# Patient Record
Sex: MALE | Race: ASIAN | NOT HISPANIC OR LATINO | URBAN - METROPOLITAN AREA
[De-identification: names, ages, dates, MRNs, and addresses within clinical notes are randomized per-mention and may not be internally consistent; named-entity substitution may affect disease eponyms.]

---

## 2017-05-16 ENCOUNTER — ALLSCRIPTS OFFICE VISIT (OUTPATIENT)
Dept: OTHER | Facility: OTHER | Age: 52
End: 2017-05-16

## 2017-05-16 DIAGNOSIS — E11.65 TYPE 2 DIABETES MELLITUS WITH HYPERGLYCEMIA (HCC): ICD-10-CM

## 2017-09-05 ENCOUNTER — ALLSCRIPTS OFFICE VISIT (OUTPATIENT)
Dept: OTHER | Facility: OTHER | Age: 52
End: 2017-09-05

## 2018-01-12 NOTE — PROGRESS NOTES
Assessment    1  Benign essential hypertension (401 1) (I10)   2  Diabetes mellitus type 2, uncontrolled (250 02) (E11 65)   3  Candidal intertrigo (112 3) (B37 2)   4  Deafness (389 9) (H91 90)    Plan  Candidal intertrigo    · Nystatin 860270 UNIT/GM External Cream   · Clotrimazole 1 % External Cream; APPLY SPARINGLY TO AFFECTED AREA(S) TWICE DAILY  Diabetes mellitus type 2, uncontrolled    · GlipiZIDE-MetFORMIN HCl - 5-500 MG Oral Tablet   · GlipiZIDE 5 MG Oral Tablet; TAKE 1 TABLET TWICE DAILY   · Januvia 100 MG Oral Tablet; TAKE 1 TABLET DAILY   · MetFORMIN HCl - 1000 MG Oral Tablet (Glucophage); TAKE 1 TABLET TWICE DAILY  Type 2 diabetes mellitus    · Januvia 100 MG Oral Tablet; TAKE 1 TABLET DAILY    Discussion/Summary  Discussion Summary:   Reviewed labs with patient  a1c 10 3  Taking glipizide metformin 5/500 twice daily (due to control and cost of med as patient has limited insurance coverage)  Stop this medication today  Start Januvia 100 mg once daily  Start metformin 1000 mg twice daily  Start glipizide 5 mg twice daily with meals  Sample of Januvia given today  Start clotrimazole topically to area twice daily  Use topical Hydrocortone 1% over the counter twice daily with clotrimazole for 5 days then discontinue  [facility] follow up in 4-6 weeks with BS log  Patient BP stable on ACE  Counseling Documentation With Imm: The patient was counseled regarding instructions for management, risk factor reductions, prognosis, patient and family education, impressions, risks and benefits of treatment options, importance of compliance with treatment  Medication SE Review and Pt Understands Tx: Possible side effects of new medications were reviewed with the patient/guardian today  The treatment plan was reviewed with the patient/guardian  The patient/guardian understands and agrees with the treatment plan      Chief Complaint  Chief Complaint Free Text Note Form: PT  presents for 1 month follow up for HTN, DM  Review Labs      History of Present Illness  HPI: 45year old male for follow up labs  Diabetes Type II (Follow-Up): The patient states he has been stable with his Type II Diabetes control since the last visit  Comorbid Illnesses: hypertension and hyperlipidemia  Symptoms: Associated symptoms include polyuria and polydipsia  Home monitoring: The patient checks his blood sugars regularly  Glycemic control has been good  Medications: the patient is adherent with his medication regimen  He denies medication side effects  Due For: a lipid panel  Hypertension (Follow-Up): The patient presents for follow-up of essential hypertension  The patient states he has been stable with his blood pressure control since the last visit  Symptoms: denies chest pain  Medications: the patient is adherent with his medication regimen  He denies medication side effects  Intertrigo (Brief): The patient is being seen for a routine clinic follow-up of intertrigo  Symptoms:  rash and localized rash  The patient is currently experiencing symptoms  Associated symptoms:  localized pruritus, but no skin soreness and no skin breakdown  Review of Systems  Complete-Male:   Constitutional: Encounter with signer, but no fever  Cardiovascular: no chest pain and no palpitations  Respiratory: no shortness of breath and no cough  Gastrointestinal: no nausea, no vomiting and no diarrhea  Integumentary: + rash in groin, purutis  Used topical crema whih he ran out of which improved  Active Problems    1  Benign essential hypertension (401 1) (I10)   2  Candidal intertrigo (112 3) (B37 2)   3  Deafness (389 9) (H91 90)   4  Diabetes mellitus type 2, uncontrolled (250 02) (E11 65)   5  Fatigue (780 79) (R53 83)   6  Headache (784 0) (R51)   7  Hyperglycemia (790 29) (R73 9)   8  Hypertriglyceridemia (272 1) (E78 1)   9  Pruritus (698 9) (L29 9)   10  Type 2 diabetes mellitus (250 00) (E11 9)    Past Medical History    1   History of Abscess of buttock (682 5) (L02 31)   2  History of Encounter for prostate cancer screening (V76 44) (Z12 5)   3  History of low back pain (V13 59) (Z87 39)   4  History of Influenza vaccine needed (V04 81) (Z23)  Active Problems And Past Medical History Reviewed: The active problems and past medical history were reviewed and updated today  Surgical History    1  Denied: History Of Prior Surgery  Surgical History Reviewed: The surgical history was reviewed and updated today  Family History    1  Family history of    2  Family history of malignant neoplasm (V16 9) (Z80 9)  Family History Reviewed: The family history was reviewed and updated today  Social History    · Never smoker   · No alcohol use   · No drug use  Social History Reviewed: The social history was reviewed and updated today  The social history was reviewed and is unchanged  Current Meds   1  Joe Contour Next Test In [employer]; TEST TWICE DAILY; Therapy: 71Ews3704 to (Evaluate:2016); Last Rx:13Jqg0045 Ordered   2  Joe Microlet Lancets Miscellaneous; TEST BLOOD SUGAR TWICE DAILY; Therapy: 37Lzc5013 to (Last Rx:16Ool0459) Ordered   3  Fenofibrate 54 MG Oral Tablet; TAKE 1 TABLET DAILY; Therapy: 22Ghn2886 to (Evaluate:2016); Last Rx:79Oyi7715 Ordered   4  GlipiZIDE-MetFORMIN HCl - 5-500 MG Oral Tablet; TAKE 1 TABLETS TWICE DAILY; Therapy: 93OND0856 to (Last Rx:77Ylb1346) Ordered   5  Lisinopril 10 MG Oral Tablet; TAKE 1 TABLET DAILY; Therapy: 21LDJ7860 to (Evaluate:2016); Last Rx:97Cbf0633 Ordered   6  Nystatin 888413 UNIT/GM External Cream; APPLY 2-3 TIMES DAILY TO AFFECTED AREA(S); Therapy: 87OVU1989 to (Last Rx:43Snk4038) Ordered   7  ZyrTEC Allergy 10 MG Oral Tablet; TAKE 1 TABLET DAILY; Therapy: 49Crt3438 to (Evaluate:2016); Last Rx:81Qic3592 Ordered  Medication List Reviewed: The medication list was reviewed and updated today  Allergies    1   No Known Drug Allergies    2  Seasonal    Vitals  Vital Signs [Data Includes: Current Encounter]    Recorded: 22QVW6252 11:14AM   Temperature 98 3 F, Tympanic   Heart Rate 80   Systolic 091, LUE, Sitting   Diastolic 88, LUE, Sitting   Height 5 ft 8 in   Weight 162 lb 4 oz   BMI Calculated 24 67   BSA Calculated 1 87   O2 Saturation 98     Physical Exam    Constitutional   General appearance: No acute distress, well appearing and well nourished  Alert, pleasant seated in NAD, signer and roomate present  Eyes   Conjunctiva and lids: No swelling, erythema, or discharge  Ears, Nose, Mouth, and Throat   Oropharynx: Normal with no erythema, edema, exudate or lesions  MMM  Pulmonary   Respiratory effort: No increased work of breathing or signs of respiratory distress  Auscultation of lungs: Clear to auscultation, equal breath sounds bilaterally, no wheezes, no rales, no rhonci  Cardiovascular   Auscultation of heart: Normal rate and rhythm, normal S1 and S2, without murmurs  RRR  Abdomen   Abdomen: Non-tender, no masses  soft, NT/ND  Lymphatic   Palpation of lymph nodes in neck: No lymphadenopathy  Musculoskeletal   Gait and station: Normal     Skin + puritic skin inner thighs bilaterally  No erythema  skin intact     Psychiatric   Mood and affect: Normal          Future Appointments    Date/Time Provider Specialty Site   02/26/2016 10:30 AM Ryan Salazar [facility] Internal Medicine [facility] PRIMARY CARE     Signatures   Electronically signed by : Rosemary Le [facility]; El 15 2016 12:24PM EST                       (Author)    Electronically signed by : Jigna العراقي MD; El 15 2016  5:28PM EST

## 2018-01-13 VITALS
HEART RATE: 71 BPM | SYSTOLIC BLOOD PRESSURE: 180 MMHG | TEMPERATURE: 97 F | OXYGEN SATURATION: 98 % | HEIGHT: 67 IN | DIASTOLIC BLOOD PRESSURE: 90 MMHG | WEIGHT: 158.6 LBS | BODY MASS INDEX: 24.89 KG/M2

## 2018-01-13 NOTE — PROGRESS NOTES
Assessment    1  Diabetes mellitus type 2, uncontrolled (250 02) (E11 65)   2  Right-sided low back pain with right-sided sciatica (724 3) (M54 41)    Plan  Candidal intertrigo    · Clotrimazole 1 % External Cream; APPLY SPARINGLY TO AFFECTED AREA(S) TWICE DAILY  Diabetes mellitus type 2, uncontrolled    · GlipiZIDE 5 MG Oral Tablet; TAKE 1 TABLET TWICE DAILY   · (1) HEMOGLOBIN A1C; Status:Active; Requested for:25Nig3896;   Right-sided low back pain with right-sided sciatica    · Meloxicam 15 MG Oral Tablet; Take 1 tablet daily   · *1 - [facility] Physical Therapy  Consult  Status: Hold For - Scheduling  Requested for:  99QFS2672  Care Summary provided  : Yes    Discussion/Summary  Discussion Summary: For back pain: advise physical therapy  Can use meloxicam as needed once per day  For Diabetes: Printed prescription for glipizide  It is part of [facility] prescription program, please try to fill there  Continue to check BG and keep a log to bring to your next appointment  Check hemoglobin A1c after 4/16/2016  Counseling Documentation With Imm: The patient was counseled regarding instructions for management, risk factor reductions, prognosis, patient and family education, impressions, risks and benefits of treatment options, importance of compliance with treatment  Medication SE Review and Pt Understands Tx: Possible side effects of new medications were reviewed with the patient/guardian today  The treatment plan was reviewed with the patient/guardian  The patient/guardian understands and agrees with the treatment plan      Chief Complaint  Chief Complaint Free Text Note Form: PT  presents for 4 week follow up for DM  Review BS log PT  states having B leg pain  Pain starting in Lower back  Started about a year ago, went away and came back  PT  works all day on feet  History of Present Illness  HPI: Pt is here for follow up for DM, poorly controlled  Recent A1C 10     Has not been taking Glipizide due to cost, reports it was $300  Has been making dietary changes  Complaints of back pain with radiation to right leg, has been a chronic problem, flares at times  Diabetes Type II (Follow-Up): The patient states he has been stable with his Type II Diabetes control since the last visit  Comorbid Illnesses: hypertension and hyperlipidemia  He has no significant interval events  Symptoms: The patient is currently asymptomatic  Associated symptoms include polyuria, but no polydipsia  Home monitoring: The patient checks his blood sugars regularly  Glycemic control has been poor  the patient reports no symptomatic hypoglycemic episodes  Medications: the patient is not adherent with his medication regimen  He denies medication side effects  Due For: a lipid panel  Back Pain (Brief): The patient is being seen for an initial evaluation of and existing diagnosis per patient back pain  This condition is not related to a specific injury  The patient has been previously evaluated by a primary care provider and by a specialty provider  (none) Symptoms:  back pain and back stiffness, but no decreased spine range of motion, no decreased flexion, no decreased extension, no decreased lateral bending, no decreased rotation, no lower extremity numbness, no lower extremity tingling and no lower extremity weakness  The pain is located in the right mid back  The pain radiates to right thigh and right calf  The patient describes the pain as sharp  The pain is intermittent  The patient describes symptoms as unchanged  Exacerbating factors:  standing and walking  Relieving factors:  did not try any treatment  Review of Systems  Complete-Male:   Constitutional: Encounter with signer, but no fever, not feeling poorly, no chills and not feeling tired  ENT: no complaints of earache, no hearing loss, no nosebleeds, no nasal discharge, no sore throat, no hoarseness     Cardiovascular: no chest pain, no palpitations and no extremity edema  Respiratory: no shortness of breath, no cough, no orthopnea, no shortness of breath during exertion and no PND  Gastrointestinal: no abdominal pain, no nausea, no vomiting and no diarrhea  Genitourinary: no dysuria  Musculoskeletal: arthralgias and myalgias  Neurological: no headache, no numbness, no dizziness and no fainting  Active Problems    1  Benign essential hypertension (401 1) (I10)   2  Candidal intertrigo (112 3) (B37 2)   3  Deafness (389 9) (H91 90)   4  Diabetes mellitus type 2, uncontrolled (250 02) (E11 65)   5  Fatigue (780 79) (R53 83)   6  Headache (784 0) (R51)   7  Hyperglycemia (790 29) (R73 9)   8  Hypertriglyceridemia (272 1) (E78 1)   9  Pruritus (698 9) (L29 9)   10  Type 2 diabetes mellitus (250 00) (E11 9)    Past Medical History    1  History of Abscess of buttock (682 5) (L02 31)   2  History of Encounter for prostate cancer screening (V76 44) (Z12 5)   3  History of low back pain (V13 59) (Z87 39)   4  History of Influenza vaccine needed (V04 81) (Z23)  Active Problems And Past Medical History Reviewed: The active problems and past medical history were reviewed and updated today  Surgical History    1  Denied: History Of Prior Surgery    Family History    1  Family history of    2  Family history of malignant neoplasm (V16 9) (Z80 9)    Social History    · Never smoker   · No alcohol use   · No drug use  Social History Reviewed: The social history was reviewed and updated today  The social history was reviewed and is unchanged  Current Meds   1  Joe Contour Next Test In [employer]; TEST TWICE DAILY; Therapy: 43Wrs4109 to (Evaluate:98Hxk5832); Last Rx:18Ddh5283 Ordered   2  Joe Microlet Lancets Miscellaneous; TEST BLOOD SUGAR TWICE DAILY; Therapy: 12Ohl2692 to (Last Rx:82Xom5820) Ordered   3  Clotrimazole 1 % External Cream; APPLY SPARINGLY TO AFFECTED AREA(S) TWICE DAILY;    Therapy: 95VCS2565 to (Evaluate:67Ntt3731); Last Rx:15Jan2016 Ordered   4  Fenofibrate 54 MG Oral Tablet; TAKE 1 TABLET DAILY; Therapy: 70Xje0855 to (Evaluate:08Jun2016); Last Rx:63Qyo2411 Ordered   5  GlipiZIDE 5 MG Oral Tablet; TAKE 1 TABLET TWICE DAILY; Therapy: 01NVI1075 to (Last Rx:15Jan2016) Ordered   6  Januvia 100 MG Oral Tablet; TAKE 1 TABLET DAILY; Therapy: 67XQW1429 to (Evaluate:12Oly1899); Last Rx:15Jan2016 Ordered   7  Januvia 100 MG Oral Tablet; TAKE 1 TABLET DAILY; Therapy: 74DTP6115 to (Evaluate:23Ihy1200); Last Rx:15Jan2016 Ordered   8  Lisinopril 10 MG Oral Tablet; TAKE 1 TABLET DAILY; Therapy: 26ACA5098 to (Evaluate:10Mar2016); Last Rx:91Qof6082 Ordered   9  MetFORMIN HCl - 1000 MG Oral Tablet; TAKE 1 TABLET TWICE DAILY; Therapy: 16RAL3322 to (Evaluate:83Lsh9736); Last Rx:15Jan2016 Ordered   10  ZyrTEC Allergy 10 MG Oral Tablet; TAKE 1 TABLET DAILY; Therapy: 71Cgg0971 to (Evaluate:06Whx2821); Last Rx:15Gpj2786 Ordered  Medication List Reviewed: The medication list was reviewed and updated today  Allergies    1  No Known Drug Allergies    2  Seasonal    Vitals  Vital Signs [Data Includes: Current Encounter]    Recorded: 26Feb2016 10:28AM   Temperature 97 2 F, Tympanic   Heart Rate 76   Systolic 035, LUE, Sitting   Diastolic 88, LUE, Sitting   Height 5 ft 8 in   Weight 164 lb 6 oz   BMI Calculated 24 99   BSA Calculated 1 88   O2 Saturation 97     Physical Exam    Constitutional   General appearance: No acute distress, well appearing and well nourished  Alert, pleasant seated in NAD, signer and roommate present  Eyes   Conjunctiva and lids: No swelling, erythema, or discharge  Ears, Nose, Mouth, and Throat   External inspection of ears and nose: Normal     Oropharynx: Normal with no erythema, edema, exudate or lesions  MMM  Pulmonary   Respiratory effort: No increased work of breathing or signs of respiratory distress      Auscultation of lungs: Clear to auscultation, equal breath sounds bilaterally, no wheezes, no rales, no rhonci  Cardiovascular   Auscultation of heart: Normal rate and rhythm, normal S1 and S2, without murmurs  RR  Examination of extremities for edema and/or varicosities: Normal     Abdomen   Abdomen: Non-tender, no masses  soft, NT/ND  Lymphatic   Palpation of lymph nodes in neck: No lymphadenopathy  Musculoskeletal   Gait and station: Normal     Inspection/palpation of joints, bones, and muscles: Abnormal   (+TTP right lumbar region, +hypertonicity noted  Negative straight leg raise b/l  sensation and strength intact  No rash, erythema or swelling noted, ROM intact to spine  )   Skin + pruritic skin inner thighs bilaterally  No erythema  skin intact  Neurologic   Cranial nerves: Cranial nerves 2-12 intact  Psychiatric   Orientation to person, place and time: Normal     Mood and affect: Normal          Future Appointments    Date/Time Provider Specialty Site   03/29/2016 10:30 AM Braulio Sanders DO Internal Medicine [address] [place] [address]     Signatures   Electronically signed by : Karl Navarro; Feb 26 2016  5:13PM EST                       (Author)    Electronically signed by :  Malou Collado MD; Feb 28 2016  7:19PM EST                       (Author)

## 2018-01-16 NOTE — MISCELLANEOUS
Provider Comments  Provider Comments:   PATIENT NO SHOWED FOR 9-5-2017 APPT  Signatures   Electronically signed by :  Georgiana Arnett; Sep  5 2017  9:01AM EST                       (Author)